# Patient Record
(demographics unavailable — no encounter records)

---

## 2024-10-16 NOTE — PHYSICAL EXAM
[General Appearance - Alert] : alert [General Appearance - In No Acute Distress] : in no acute distress [Sclera] : the sclera and conjunctiva were normal [Outer Ear] : the ears and nose were normal in appearance [Oropharynx] : the oropharynx was normal [Neck Appearance] : the appearance of the neck was normal [Neck Cervical Mass (___cm)] : no neck mass was observed [Jugular Venous Distention Increased] : there was no jugular-venous distention [Thyroid Diffuse Enlargement] : the thyroid was not enlarged [Thyroid Nodule] : there were no palpable thyroid nodules [Auscultation Breath Sounds / Voice Sounds] : lungs were clear to auscultation bilaterally [Heart Rate And Rhythm] : heart rate was normal and rhythm regular [Heart Sounds] : normal S1 and S2 [Heart Sounds Gallop] : no gallops [Murmurs] : no murmurs [Heart Sounds Pericardial Friction Rub] : no pericardial rub [Edema] : there was no peripheral edema [Bowel Sounds] : normal bowel sounds [Abdomen Soft] : soft [Abdomen Tenderness] : non-tender [Abdomen Mass (___ Cm)] : no abdominal mass palpated [Cervical Lymph Nodes Enlarged Posterior Bilaterally] : posterior cervical [Cervical Lymph Nodes Enlarged Anterior Bilaterally] : anterior cervical [Supraclavicular Lymph Nodes Enlarged Bilaterally] : supraclavicular [No Spinal Tenderness] : no spinal tenderness [Skin Color & Pigmentation] : normal skin color and pigmentation [Skin Turgor] : normal skin turgor [] : no rash [No Focal Deficits] : no focal deficits [Oriented To Time, Place, And Person] : oriented to person, place, and time [Impaired Insight] : insight and judgment were intact [Affect] : the affect was normal [FreeTextEntry1] : No synovitis;  (+)multiple non-tender B/L Heberden's nodes;  right hip w/ pain upon internal rotation, (+)tenderness over right trochanteric bursa;  normal ROM in rest of joints

## 2024-10-16 NOTE — ASSESSMENT
[FreeTextEntry1] : 68 year old female with:  - Reiterated importance of exercise  - Cont Tylenol prn (avoiding NSAID's as pt on Pllavix).  - warm compresses  - OTC topical analgesics - pt gets some relief from Voltaren gel. 2) Right knee pain: MRi revealed advanced OA + complex medial meniscal tear.  - Reiterated importance of knee exercises  - Analgesia as above 3) Osteoporosis: recent DEXA stable  - Cont alendronate 70mg weekly.  - Cont vit D. Pt was told to avoid calcium due to hx of kidney stones.  - Weight bearing exercise as tolerated.  - Will plan to repeat DEXA in 3/25. 4) Intermittent pain in hands: due to OA.  currently asymptomatic  - Hand exercise  - Analgesia as above.

## 2024-10-16 NOTE — HISTORY OF PRESENT ILLNESS
[Arthralgias] : arthralgias [FreeTextEntry1] : 10/16/2024:  Feeling "the same" since last visit.  Still w/ pain in her right hip and right knee, unchanged.  She saw orthopedics, who is planning right THR.  Also still w/ pain in her LUE - occurs primarily w/ activity.  No new complaints. 8/12/2024:  Pains in her right hip and right knee worse since last visit - both occur primarily w/ activity / no significant pain at rest.  She also now c/o pain in her proximal LUE.  No other new complaints.   6/19/2024:  Pt c/o progressively worsening severe pain in her right hips and right knee - over the past 2 weeks it has become so severe that she has difficulty walking.  The pain occurs primarily w/ any movement or when leaning on her hip/knee.  No pain at rest.  No joint swelling/erythema.  No pain in the rest of her joints. 2/14/2024:  Right hip pain had worsened over the past several weeks.  She saw orthopedics, who diagnosed her with trochanteric bursitis and performed a C-S injection, since which her pain has again improved.  LBP has resolved.  Right knee pain remains virtually resolved as well.  No other complaints. [Anorexia] : no anorexia [Weight Loss] : no weight loss [Malaise] : no malaise [Fever] : no fever [Chills] : no chills [Fatigue] : no fatigue [Malar Facial Rash] : no malar facial rash [Skin Lesions] : no lesions [Skin Nodules] : no skin nodules [Oral Ulcers] : no oral ulcers [Cough] : no cough [Dry Mouth] : no dry mouth [Dysphonia] : no dysphonia [Dysphagia] : no dysphagia [Shortness of Breath] : no shortness of breath [Chest Pain] : no chest pain [Joint Swelling] : no joint swelling [Joint Warmth] : no joint warmth [Joint Deformity] : no joint deformity [Decreased ROM] : no decreased range of motion [Morning Stiffness] : no morning stiffness [Falls] : no falls [Difficulty Standing] : no difficulty standing [Difficulty Walking] : no difficulty walking [Dyspnea] : no dyspnea [Myalgias] : no myalgias [Muscle Weakness] : no muscle weakness [Muscle Spasms] : no muscle spasms [Muscle Cramping] : no muscle cramping [Visual Changes] : no visual changes [Eye Pain] : no eye pain [Eye Redness] : no eye redness [Dry Eyes] : no dry eyes

## 2024-11-04 NOTE — PHYSICAL EXAM
[Well Developed] : well developed [Well Nourished] : well nourished [No Acute Distress] : no acute distress [Normal Conjunctiva] : normal conjunctiva [Normal Venous Pressure] : normal venous pressure [No Carotid Bruit] : no carotid bruit [Normal S1, S2] : normal S1, S2 [No Murmur] : no murmur [No Rub] : no rub [No Gallop] : no gallop [Clear Lung Fields] : clear lung fields [Good Air Entry] : good air entry [No Respiratory Distress] : no respiratory distress  [Soft] : abdomen soft [Non Tender] : non-tender [No Masses/organomegaly] : no masses/organomegaly [Normal Bowel Sounds] : normal bowel sounds [Normal Gait] : normal gait [No Edema] : no edema [No Cyanosis] : no cyanosis [No Clubbing] : no clubbing [No Varicosities] : no varicosities [No Rash] : no rash [No Skin Lesions] : no skin lesions [Moves all extremities] : moves all extremities [No Focal Deficits] : no focal deficits [Normal Speech] : normal speech [Alert and Oriented] : alert and oriented [Normal memory] : normal memory [de-identified] : [facility] site WNL

## 2024-11-04 NOTE — HISTORY OF PRESENT ILLNESS
[FreeTextEntry1] : 69F with HTN, HLD, CAD s/p PCI presents for preop evaluation  Scheduled for R THR on 11/18 with Dr Don Brian  CTA with significant CAD [facility] with 70% mRCA s/p NICOLE- recommended for 3 months of DAPT Feeling well overall, dyspnea from the past has improved  Diffuse muscle aches have dissipated (using statin x years)  Chronically elevated HR- sinus tach on ECG's  Nonsmoker, +secondhand smoke exposure. Mother had valve replacement around 50, F- MI in his 60s. Retired , currently watching her grandchildren  ECG: ST @ 111, PRWP  TTE 2024:  1. Left ventricular cavity is normal in size. Left ventricular wall thickness is normal. Left ventricular systolic function is normal with an ejection fraction of 63 % by Figueroa's method of disks. There are no regional wall motion abnormalities seen. 2. Normal left ventricular diastolic function, with normal filling pressure. 3. The left atrium is normal in size. 4. Fibrocalcific aortic valve sclerosis without stenosis. 5. There is mild calcification of the mitral valve annulus.  CTA 7/2024: . Probable moderate LAD and RCA stenosis. More severe stenosis is not excluded. [facility] 7/23/24:  70% mRCA s/p NICOLE ([facility])  Asa 81mg  Clopidogrel 75mg (d/c on 11/11/24)  Rosuvastatin 10mg  Enalapril 10mg HCTZ 25mg (cut to 12.5mg daily)  Add Toprol 25mg daily

## 2024-11-04 NOTE — DISCUSSION/SUMMARY
[EKG obtained to assist in diagnosis and management of assessed problem(s)] : EKG obtained to assist in diagnosis and management of assessed problem(s) [FreeTextEntry1] : CAD s/p PCI: 70% mRCA. No further symptoms  Recommendation for DAPT x 3 months, pending hip surgery in 2 weeks As she has now been on DAPT for 3+ months, OK to d/c plavix 7 days prior to surgery Continue aspirin 81mg  Continue statin  HTN: BP running lower today. HR chronically elevated. Add Toprol 25mg. Cut HCTZ to 12.5mg daily, continue enalapril   HLD: Lipids good on statin, low HDL. Continue meds.   Limited mobility due to hip pain. No evidence of ongoing ischemia, arrhythmia, valvular heart disease or decompensated heart failure.  This patient is optimized from a cardiac standpoint for this intermediate risk surgery.  No further cardiac testing is necessary prior to surgery.  RV 4M

## 2024-11-05 NOTE — HISTORY OF PRESENT ILLNESS
[Coronary Artery Disease] : coronary artery disease [Family Member] : family member with adverse anesthesia reaction/sudden death [Self] : previous adverse anesthesia reaction [(Patient denies any chest pain, claudication, dyspnea on exertion, orthopnea, palpitations or syncope)] : Patient denies any chest pain, claudication, dyspnea on exertion, orthopnea, palpitations or syncope [Unable to assess] : unable to assess [Anti-Platelet Agents: _____] : Anti-Platelet Agents: [unfilled] [Aortic Stenosis] : no aortic stenosis [Atrial Fibrillation] : no atrial fibrillation [Recent Myocardial Infarction] : no recent myocardial infarction [Implantable Device/Pacemaker] : no implantable device/pacemaker [Asthma] : no asthma [COPD] : no COPD [Smoker] : not a smoker [Chronic Anticoagulation] : no chronic anticoagulation [Chronic Kidney Disease] : no chronic kidney disease [Diabetes] : no diabetes [FreeTextEntry1] : DANIEL DELGADO [FreeTextEntry2] : 11/18/24 [FreeTextEntry3] : Dr Don Brian [FreeTextEntry4] : 69F w/ PMH of CAD s/p NICOLE, OA, osteoporosis, hip bursitis, HTN, HLD is coming in for MCA for R THR.  No acute complaints.  [FreeTextEntry5] : Never officially diagnosed w/ sleep apnea. Has had vomiting from anesthesia in the past. Mother would also have N/V from anesthesia in the past.

## 2024-11-05 NOTE — ASSESSMENT
[Patient Optimized for Surgery] : Patient optimized for surgery [No Further Testing Recommended] : no further testing recommended [As per surgery] : as per surgery [FreeTextEntry4] : 69F w/ PMH of CAD s/p NICOLE, OA, osteoporosis, hip bursitis, HTN, HLD is coming in for MCA for R THR.  #Pre-op exam -Reviewed CBC, CMP, EKG. CBC repeated, platelets acceptable. -Limited mobility due to hip pain  Patient is medically optimized for this intermediate risk surgery.

## 2024-11-05 NOTE — PHYSICAL EXAM
[Normal] : affect was normal and insight and judgment were intact [Normal Sclera/Conjunctiva] : normal sclera/conjunctiva [Normal Outer Ear/Nose] : the outer ears and nose were normal in appearance [No JVD] : no jugular venous distention [No Respiratory Distress] : no respiratory distress  [No Accessory Muscle Use] : no accessory muscle use [Clear to Auscultation] : lungs were clear to auscultation bilaterally [Normal Rate] : normal rate  [Regular Rhythm] : with a regular rhythm [No Edema] : there was no peripheral edema [Soft] : abdomen soft [Non Tender] : non-tender [Non-distended] : non-distended [de-identified] : Ambulates w/ walker

## 2024-11-05 NOTE — PHYSICAL EXAM
[Normal] : affect was normal and insight and judgment were intact [Normal Sclera/Conjunctiva] : normal sclera/conjunctiva [Normal Outer Ear/Nose] : the outer ears and nose were normal in appearance [No JVD] : no jugular venous distention [No Respiratory Distress] : no respiratory distress  [No Accessory Muscle Use] : no accessory muscle use [Clear to Auscultation] : lungs were clear to auscultation bilaterally [Normal Rate] : normal rate  [Regular Rhythm] : with a regular rhythm [No Edema] : there was no peripheral edema [Soft] : abdomen soft [Non Tender] : non-tender [Non-distended] : non-distended [de-identified] : Ambulates w/ walker

## 2024-11-07 NOTE — DATA REVIEWED
[FreeTextEntry1] : MRI R HIP [facility] IMPRESSION: Severe right hip joint space narrowing with full-thickness chondral loss with multifocal subchondral cysts within the articulating femoral head with marrow edema with undulated appearance of the articular contour of the femoral head, likely sequela of small subchondral fractures. Moderate hip effusion/synovial proliferation.

## 2024-11-07 NOTE — PHYSICAL EXAM
[Right] : right hip [] : no ecchymosis [de-identified] : KNEE PAIN WITH INTERNAL ROTATION OF HIP.  [TWNoteComboBox7] : flexion 100 degrees [de-identified] : external rotation 20 degrees [TWNoteComboBox6] : internal rotation 20 degrees

## 2024-11-07 NOTE — HISTORY OF PRESENT ILLNESS
[de-identified] : R HIP PAIN FOR 6 MONTHS.  NO INJURY OR HISTORY.  HAS BEEN TX ELSEWHERE WITH LATERAL INJECTIONS.  USING A ROLLATOR FOR STABILITY.  ALSO C/O R KNEE PAIN.  ON PLAVIX S/P CARDIAC STENT  TYLENOL ES DOES NOT HELP. HER DAUGHTER IS HERE.  11.7.24 PATIENT HERE FOR RIGHT HIP PAIN.

## 2024-11-07 NOTE — DISCUSSION/SUMMARY
[de-identified] : I, Loretta Torres, am scribing for Dr. Brian in his presence for the chief complaint, physical exam, studies, assessment, and/or plan.

## 2024-11-07 NOTE — ASSESSMENT
[FreeTextEntry1] : 69 year F WITH MODERATE RT HIP PAIN. PAIN IS IN THE GROIN AND DOES NOT RADIATE. PAIN WORSENS WITH WALKING PROLONGED DISTANCES. PAIN IS AFFECTING ADL AND FUNCTIONAL ACTIVITIES, PUTTING ON SHOES AND SOCKS. XRAYS REVIEWED WITH ADVANCED OA. HIP MRI REVIEWED WITH ADVANCED OA, AVN. TREATMENT OPTIONS REVIEWED. QUESTIONS ANSWERED. RT JOSUE AGAIN DISCUSSED AT LENGTH.   PMHx: CAD, CARDIAC STENT JULY 2024 - ON PLAVIX, HLD NO METAL ALLERGIES NO H/O DM NO H/O DVT/PE NO H/O MRSA/VRSA  RT JOSUE - SCHEDULED 11/18/24  The patient was advised of the diagnosis. The natural history of the pathology was explained in full to the patient in layman's terms. All questions were answered. The risks and benefits of surgical and non-surgical treatment alternatives were explained in full to the patient.   We discussed my findings and the natural history of their condition. We talked about the details of the proposed surgery and the recovery. We discussed the material risks, possible benefits and alternatives to surgery. The risks include but are not limited to infection, bleeding and possible need for blood transfusion, fracture, bowel blockage, bladder retention or infection, need for reoperation, stiffness and/or limited range of motion, possible damage to nerves and blood vessels, failure of fixation of components, risk of deep vein thromboses and pulmonary embolism, wound healing problems, dislocation, and possible leg length discrepancy. Although incredibly rare, we also discussed the risks of a cardiac event, stroke and even death during, or following, the surgery. We discussed the type of implants the patient will be receiving and the type of fixation that will be used, as well as whether a robot or computer navigation aide will be used. The patient understands they will need medical clearance and will attend a preoperative joint education class. We also discussed the type of anesthesia they will receive, and the risks associated with hospital or rehab length of stay, obesity, diabetes and smoking.   Patient Complains of pain in the affected joint with a level that often reaches greater than an 8/10. The pain has been progressively worsening throughout his/her treatment course. The pain has interfered with their ADLs and worsens with weight bearing. On exam they often have episodes of swelling/effusion with limited ROM. Pain worsens with ROM passive and active and I can palpate crepitus.   X- rays were reviewed with the patient and they show joint space narrowing, subchondral sclerosis, osteophyte formation, and subchondral cysts. After a period of more than 12 weeks physical therapy or exercise program done with me or another treating physician they have continued pain. The patient has failed a trial of NSAID medication or pain relievers if they were unable to tolerate NSAID medications as well as a series of injections, steroids, or hyaluronic acid. After a long discussion with the patient both the patient and I have decided we have exhausted all forms of less radical treatments, and they would like to proceed with Total Joint Replacement.   Patient will plan to schedule surgery. Patient requires rolling walker and 3-in-1 commode S/P surgery. Patient currently has limited mobility that significantly impairs their ability to participate in one or more mobility related activities of daily living in the home. The patient is able to safely use the walker, and the functional mobility deficit can be sufficiently resolved with the use of a walker. Patient will also be confined to one level of the home environment and there is no toilet on that level. Requires 3-in-1 commode for bedside use as patient cannot access bathroom safely in their home in timely manner. Will order rolling walker and 3-in-1 commode.

## 2024-12-12 NOTE — HISTORY OF PRESENT ILLNESS
[2] : 2 [de-identified] : R HIP PAIN FOR 6 MONTHS.  NO INJURY OR HISTORY.  HAS BEEN TX ELSEWHERE WITH LATERAL INJECTIONS.  USING A ROLLATOR FOR STABILITY.  ALSO C/O R KNEE PAIN.  ON PLAVIX S/P CARDIAC STENT  TYLENOL ES DOES NOT HELP. HER DAUGHTER IS HERE.  11.7.24 PATIENT HERE FOR RIGHT HIP PAIN.  12.12.24 PATIENT HERE FOR RIGHT HIP PAIN. DOS: 11.18.24

## 2024-12-12 NOTE — PHYSICAL EXAM
[Right] : right hip [] : no sign of infection [FreeTextEntry9] : WAS NOT ASSESSED.  [de-identified] : WAS NOT ASSESSED.

## 2024-12-12 NOTE — ASSESSMENT
[FreeTextEntry1] : S/P RT JOSUE 11/18/24: DOING WELL. NO F/C/S. XRAYS REVIEWED WITH COMPONENTS WELL FIXED. NO SIGNS OF INFECTION. WE DISCUSSED THE IMPORTANCE OF ELEVATION TO REDUCE SWELLING. PROPER ELEVATION TECHNIQUES DISCUSSED. ABX AND PRECAUTIONS REVIEWED. PT RX. QUESTIONS ANSWERED.

## 2024-12-23 NOTE — HISTORY OF PRESENT ILLNESS
[FreeTextEntry1] : 69F w/ PMH of CAD s/p NICOLE, OA, osteoporosis, hip bursitis, HTN, HLD is coming in for follow up of chronic conditions. [de-identified] : 69F w/ PMH of CAD s/p NICOLE, OA, osteoporosis, hip bursitis, HTN, HLD is coming in for follow up of chronic conditions.  Had R hip replacement approx 1 month ago, doing very well. Patient's pain and mobility have improved greatly.  Took alendronate today, but not other meds.

## 2024-12-23 NOTE — ASSESSMENT
[FreeTextEntry1] : 69F w/ PMH of CAD s/p NICOLE, OA, osteoporosis, hip bursitis, HTN, HLD is coming in for follow up of chronic conditions.  #HTN -BP today acceptable, has not taken her BP meds today as of yet -C/w current HTN med regimen  -CMP today  #CAD -C/w ASA, off plavix now as she completed 3 months as per [facility]   #GERD -Well controlled on PPI, reduce to pantoprazole 20mg qd and assess response   #HLD -Check Lipid profile today   #Hx of anemia -Check CBC  #Osteoporosis -C/w Alendronate, recommended she restart her Vitamin D supplements   RTC in 3 months

## 2024-12-23 NOTE — PHYSICAL EXAM
[No Respiratory Distress] : no respiratory distress  [No Accessory Muscle Use] : no accessory muscle use [Clear to Auscultation] : lungs were clear to auscultation bilaterally [Regular Rhythm] : with a regular rhythm [Normal] : affect was normal and insight and judgment were intact [de-identified] : rate tachycardic  [de-identified] : R Hip incision from surgery c/d/i, no purulence or erythema

## 2025-02-04 NOTE — HEALTH RISK ASSESSMENT
[Patient reported mammogram was normal] : Patient reported mammogram was normal [Patient reported colonoscopy was normal] : Patient reported colonoscopy was normal [Yes] : Yes [Monthly or less (1 pt)] : Monthly or less (1 point) [1 or 2 (0 pts)] : 1 or 2 (0 points) [Never (0 pts)] : Never (0 points) [No] : In the past 12 months have you used drugs other than those required for medical reasons? No [Never] : Never [Audit-CScore] : 1 [MammogramDate] : 05/24 [BoneDensityDate] : 03/23 [BoneDensityComments] : Osteoporosis  [ColonoscopyComments] : Repeat in 10 years recommended

## 2025-02-04 NOTE — HISTORY OF PRESENT ILLNESS
[FreeTextEntry1] : 69F w/ PMH of CAD s/p NICOLE, OA, osteoporosis, hip bursitis, HTN, HLD is coming in for AWV.  [de-identified] : 69F w/ PMH of CAD s/p NICOLE, OA, osteoporosis, hip bursitis, HTN, HLD is coming in for AWV.   Follows w/ Neurologist Dr. Dr. Esquivel for left parietal calvarial lesion, currently stable   Grandson at home, just got flu. Patient feels fine currently.   Some sciatica of L leg. Going to PT after hip replacement

## 2025-02-04 NOTE — PHYSICAL EXAM
[Normal Sclera/Conjunctiva] : normal sclera/conjunctiva [PERRL] : pupils equal round and reactive to light [EOMI] : extraocular movements intact [No Lymphadenopathy] : no lymphadenopathy [Supple] : supple [Thyroid Normal, No Nodules] : the thyroid was normal and there were no nodules present [No Respiratory Distress] : no respiratory distress  [No Accessory Muscle Use] : no accessory muscle use [Clear to Auscultation] : lungs were clear to auscultation bilaterally [Normal Rate] : normal rate  [Regular Rhythm] : with a regular rhythm [Soft] : abdomen soft [Non Tender] : non-tender [Non-distended] : non-distended [Normal Supraclavicular Nodes] : no supraclavicular lymphadenopathy [Normal Anterior Cervical Nodes] : no anterior cervical lymphadenopathy [No Focal Deficits] : no focal deficits [Normal] : affect was normal and insight and judgment were intact [de-identified] : trace edema b/l

## 2025-02-25 NOTE — HISTORY OF PRESENT ILLNESS
[FreeTextEntry1] : 69F here for evaluation for if she needs a repeat colonoscopy.   Last colonoscopy 3/2021 - diverticulosis of L colon, otherwise normal, no follow up interval indicated on patient's report.  EGD 3/2021 - gastritis, biopsied, otherwise normal, normal esophagus - biopsied. No path available.   If gets constipated can have some blood on toilet paper in bowl. Occasional. If she is regular and not pushing, there is no issue. No abdominal pain. No FHx CRC  hgb 9, was previously normal. Had hip replacement surgery 11/18/24.   PMHx CAD s/p NICOLE 7/2024, HTN, HL PSHx total hip replacement R hip 11/18/24 All NKDA Meds enalapril, ASA 81mg, clopidogrel. alendronate, HCTZ, metoprolol, pantoprazole 20mg, rosuvastatin, cyclobenzaprine, iron BID, D3 SHx nonsmoker, occ ETOH, retired FHx neg for GI d/o, GI cancer

## 2025-02-25 NOTE — ASSESSMENT
[FreeTextEntry1] : 1. CRC screening - up to date. last exam 2021, no lesions, photos look like good prep, can repeat in 10 years, 2031.  2. Anemia - suspect related to blood loss from hip surgery given time course. Agree with PO Fe.  Follow up PRN

## 2025-02-28 NOTE — IMAGING
[Right] : right hip with pelvis [All Views] : anteroposterior, lateral [Components well fixed, in good position] : Components well fixed, in good position [Disc space narrowing] : Disc space narrowing

## 2025-02-28 NOTE — PHYSICAL EXAM
[Right] : right hip [Flexion] : flexion [Extension] : extension [] : no sign of infection [FreeTextEntry9] : WAS NOT ASSESSED.  [de-identified] : WAS NOT ASSESSED.

## 2025-02-28 NOTE — HISTORY OF PRESENT ILLNESS
[2] : 2 [de-identified] : R HIP PAIN FOR 6 MONTHS.  NO INJURY OR HISTORY.  HAS BEEN TX ELSEWHERE WITH LATERAL INJECTIONS.  USING A ROLLATOR FOR STABILITY.  ALSO C/O R KNEE PAIN.  ON PLAVIX S/P CARDIAC STENT  TYLENOL ES DOES NOT HELP. HER DAUGHTER IS HERE.  11.7.24 PATIENT HERE FOR RIGHT HIP PAIN.  12.12.24 PATIENT HERE FOR RIGHT HIP PAIN. DOS: 11.18.24  2.27.25 PATIENT HERE FOR RIGHT HIP PAIN. DOS: 11.18.24. SINCE LAST VISIT PATIENT STATES LEFT HIP STARTED TO HURT. PAIN STARTED AFTER LIFTING A 2 YO CHILD. PAIN RADAITES FROM LOW BACK DOWN INTO THIGH. NO GROIN PAIN. PARESTEHSIAS AT TIMES. LEG FEELS WEAK. NO DROP FOOT.  RIGHT HIP DOING GREAT IN PT

## 2025-02-28 NOTE — HISTORY OF PRESENT ILLNESS
[2] : 2 [de-identified] : R HIP PAIN FOR 6 MONTHS.  NO INJURY OR HISTORY.  HAS BEEN TX ELSEWHERE WITH LATERAL INJECTIONS.  USING A ROLLATOR FOR STABILITY.  ALSO C/O R KNEE PAIN.  ON PLAVIX S/P CARDIAC STENT  TYLENOL ES DOES NOT HELP. HER DAUGHTER IS HERE.  11.7.24 PATIENT HERE FOR RIGHT HIP PAIN.  12.12.24 PATIENT HERE FOR RIGHT HIP PAIN. DOS: 11.18.24  2.27.25 PATIENT HERE FOR RIGHT HIP PAIN. DOS: 11.18.24. SINCE LAST VISIT PATIENT STATES LEFT HIP STARTED TO HURT. PAIN STARTED AFTER LIFTING A 2 YO CHILD. PAIN RADAITES FROM LOW BACK DOWN INTO THIGH. NO GROIN PAIN. PARESTEHSIAS AT TIMES. LEG FEELS WEAK. NO DROP FOOT.  RIGHT HIP DOING GREAT IN PT

## 2025-02-28 NOTE — PHYSICAL EXAM
[Right] : right hip [Flexion] : flexion [Extension] : extension [] : no sign of infection [FreeTextEntry9] : WAS NOT ASSESSED.  [de-identified] : WAS NOT ASSESSED.

## 2025-02-28 NOTE — ASSESSMENT
[FreeTextEntry1] : S/P RT JOSUE 11/18/24: DOING WELL. NO F/C/S. XRAYS REVIEWED WITH COMPONENTS WELL FIXED. NO SIGNS OF INFECTION. WE DISCUSSED THE IMPORTANCE OF ELEVATION TO REDUCE SWELLING. PROPER ELEVATION TECHNIQUES DISCUSSED. ABX AND PRECAUTIONS REVIEWED. PT RX. QUESTIONS ANSWERED.   PAIN TO LEFT FROM BACK. XRAYS WITH DEG CHANGES AND SPONDY WILL GET MRI TO R/O COMPRESSION FX.  MDP GIVEN, MOBIC GIVEN FOR AFTER MDP WILL CALL FOR RESULTS. HOLD PT UNTIL MRI LUMBAR   JOSUE IS DOING GREAT. WILL FU IN NOV CONT PT HIP

## 2025-03-20 NOTE — HISTORY OF PRESENT ILLNESS
[FreeTextEntry1] : 69F with HTN, HLD, CAD s/p PCI presents for preop evaluation  Tolerated R THR 11/2024 Plavix d/c'ed after 3 months  Some ongoing back problems, going to PT Some dyspnea with climbing stairs  No CP Some lightheadedness when turning head Toprol started at last visit for chronically elevated HR, HR improved  Anemic since surgery, now on iron supplements   Diffuse muscle aches have dissipated (using statin x years)   Nonsmoker, +secondhand smoke exposure. Mother had valve replacement around 50, F- MI in his 60s. Retired , currently watching her grandchildren  ECG: ST @ 87, PRWP  TTE 2024:  1. Left ventricular cavity is normal in size. Left ventricular wall thickness is normal. Left ventricular systolic function is normal with an ejection fraction of 63 % by Figueroa's method of disks. There are no regional wall motion abnormalities seen. 2. Normal left ventricular diastolic function, with normal filling pressure. 3. The left atrium is normal in size. 4. Fibrocalcific aortic valve sclerosis without stenosis. 5. There is mild calcification of the mitral valve annulus.  CTA 7/2024: . Probable moderate LAD and RCA stenosis. More severe stenosis is not excluded. C 7/23/24:  70% mRCA s/p NICOLE ([facility])  Asa 81mg  Rosuvastatin 10mg  Enalapril 10mg HCTZ 12.5mg daily  Toprol 25mg daily

## 2025-03-20 NOTE — DISCUSSION/SUMMARY
[FreeTextEntry1] : CAD s/p PCI: 70% mRCA. No further symptoms. S/p 3 months of DAPT   Continue aspirin 81mg monotherapy Continue statin- lipids great  HTN: BP decent. HR chronically elevated but improving on Toprol  Continue Toprol 25mg Continue HCTZ, Enalapril, normal CMP   HLD: Lipids good on statin, low HDL. Continue meds.   RV 6M

## 2025-04-09 NOTE — ASSESSMENT
[FreeTextEntry1] : 68 year old female with: 1)  Right hip pain:  due to OA.  Now resolved s/p recent THR  - Reiterated importance of exercise  - Cont Tylenol prn (avoiding NSAID's as pt on Plavix).  - warm compresses  - OTC topical analgesics - pt gets some relief from Voltaren gel. 2) Right knee pain: MRI revealed advanced OA + complex medial meniscal tear.  Currently improved.  - Reiterated importance of knee exercises  - Analgesia as above 3) Osteoporosis: recent DEXA slightly worse.    - D/C alendronate, will plan to start Prolia.  - Cont vit D.  Pt was told to avoid calcium due to hx of kidney stones.  - Weight bearing exercise as tolerated.  - Check labs. 4) Intermittent pain in hands: due to OA.  currently asymptomatic  - Hand exercise  - Analgesia as above. 5)  Low back pain:  prior MRI revealed OA + spinal stenosis   - Refer to PT / reiterated importance of back exercises   - Analgesia prn as above.

## 2025-04-09 NOTE — HISTORY OF PRESENT ILLNESS
[Arthralgias] : arthralgias [FreeTextEntry1] : 4/9/2025:  Pt underwent right THR in 11/2024 - since then her right hip pain has virtually resolved.  She now c/o severe low back pain, worst w/ standing/walking.  Improves w/ leaning forward. 10/16/2024:  Feeling "the same" since last visit.  Still w/ pain in her right hip and right knee, unchanged.  She saw orthopedics, who is planning right THR.  Also still w/ pain in her LUE - occurs primarily w/ activity.  No new complaints. 8/12/2024:  Pains in her right hip and right knee worse since last visit - both occur primarily w/ activity / no significant pain at rest.  She also now c/o pain in her proximal LUE.  No other new complaints.   6/19/2024:  Pt c/o progressively worsening severe pain in her right hips and right knee - over the past 2 weeks it has become so severe that she has difficulty walking.  The pain occurs primarily w/ any movement or when leaning on her hip/knee.  No pain at rest.  No joint swelling/erythema.  No pain in the rest of her joints. 2/14/2024:  Right hip pain had worsened over the past several weeks.  She saw orthopedics, who diagnosed her with trochanteric bursitis and performed a C-S injection, since which her pain has again improved.  LBP has resolved.  Right knee pain remains virtually resolved as well.  No other complaints. [Anorexia] : no anorexia [Weight Loss] : no weight loss [Malaise] : no malaise [Fever] : no fever [Chills] : no chills [Fatigue] : no fatigue [Malar Facial Rash] : no malar facial rash [Skin Lesions] : no lesions [Skin Nodules] : no skin nodules [Oral Ulcers] : no oral ulcers [Cough] : no cough [Dry Mouth] : no dry mouth [Dysphonia] : no dysphonia [Dysphagia] : no dysphagia [Shortness of Breath] : no shortness of breath [Chest Pain] : no chest pain [Joint Swelling] : no joint swelling [Joint Warmth] : no joint warmth [Joint Deformity] : no joint deformity [Decreased ROM] : no decreased range of motion [Morning Stiffness] : no morning stiffness [Falls] : no falls [Difficulty Standing] : no difficulty standing [Difficulty Walking] : no difficulty walking [Dyspnea] : no dyspnea [Myalgias] : no myalgias [Muscle Weakness] : no muscle weakness [Muscle Spasms] : no muscle spasms [Muscle Cramping] : no muscle cramping [Visual Changes] : no visual changes [Eye Pain] : no eye pain [Eye Redness] : no eye redness [Dry Eyes] : no dry eyes

## 2025-04-22 NOTE — IMAGING
[de-identified] : L Spine Inspection: No defects or deformities Palpation: No tenderness or spasms in b/l lumbar paraspinal musculature ROM: diminished in all planes Strength: 5/5 b/l hip flexors, knee extensors, ankle dorsiflexors, EHL, ankle plantarflexors Neuro: Sensation LT I Negative b/l SLR Toe and heal walk intact Gait non antalgic. Ambulating with a cane.

## 2025-04-22 NOTE — HISTORY OF PRESENT ILLNESS
[Lower back] : lower back [de-identified] : 04/22/2025: 69 year F presenting for evaluation of lower back pain that started after carrying toddler 3 months ago. Pain across lower back intermittently radiates into LLE. Saw Dr. Brian on 2/25, had xr's and L Spine completed. Was informed no significant left hip OA that would require sx. Was given rx for pain meds, taking them sparingly with good benefit. Pain has been progressively improving since.   pmhx: R JOSUE on 11/2024

## 2025-04-22 NOTE — ASSESSMENT
[FreeTextEntry1] : 70 y/o F with improving lower back pain and intermittent LLE radiculopathy x 2 months. MRI: listhesis at L3-4 with moderate spinal and foraminal stenosis; mild-moderate at L4-5. Exam w/o red flags or weakness. Sxs on an improving trend with PT and meds (MDP)  - C/w PT and focus on strengthening core.  - Discussed if radicular pain worsens, will consider pain mgmt. - F/up in 1 month.

## 2025-04-22 NOTE — DATA REVIEWED
[MRI] : MRI [Lumbar Spine] : lumbar spine [Report was reviewed and noted in the chart] : The report was reviewed and noted in the chart [I independently reviewed and interpreted images and report] : I independently reviewed and interpreted images and report [FreeTextEntry1] : @[facility] MRI 2/27/25 1. L3-4 anterolisthesis with moderate b/l facet OA & spinal/foraminal stenosis.  2. L4-5 mild-moderate central stenosis

## 2025-04-22 NOTE — REASON FOR VISIT
[FreeTextEntry2] : New patient lower back pain. MRI and CT scan done at [facility]

## 2025-05-20 NOTE — HISTORY OF PRESENT ILLNESS
[Lower back] : lower back [de-identified] : 5/20/25: Follow up L Spine. Started PT, however, pain into LLE has worsened in the past month. Taking pain meds with some relief.    04/22/2025: 69 year F presenting for evaluation of lower back pain that started after carrying toddler 3 months ago. Pain across lower back intermittently radiates into LLE. Saw Dr. Brian on 2/25, had xr's and L Spine completed. Was informed no significant left hip OA that would require sx. Was given rx for pain meds, taking them sparingly with good benefit. Pain has been progressively improving since.   pmhx: R JOSUE on 11/2024

## 2025-05-20 NOTE — IMAGING
[de-identified] : L Spine Inspection: No defects or deformities Palpation: No tenderness or spasms in b/l lumbar paraspinal musculature ROM: diminished in all planes Strength: 5/5 b/l hip flexors, knee extensors, ankle dorsiflexors, EHL, ankle plantarflexors Neuro: Sensation LT I Negative b/l SLR Toe and heal walk intact Gait non antalgic. Ambulating with a cane.

## 2025-05-20 NOTE — ASSESSMENT
[FreeTextEntry1] : 70 y/o F with improving lower back pain and worsening LLE radiculopathy x 2 months. MRI: listhesis at L3-4 with moderate spinal and foraminal stenosis; mild-moderate at L4-5. Attending PT w/o much benefit.    - Referred to pain mgmt, recommend a L3-4 LESI.  - Renewed pain meds (Oxy/Acetaminophen).  - F/up 2 weeks s/p injection.

## 2025-06-11 NOTE — PROCEDURE
[FreeTextEntry3] : Date of Service: 06/11/2025   Account: [id number]   Patient: NORM DUENAS   YOB: 1955   Age: 69 year     Surgeon: Gustavo Coburn M.D.   Assistant: None.   Pre-Operative Diagnosis: Lumbosacral radiculitis   Post Operative Diagnosis: Lumbosacral radiculitis   Procedure: Left L5-S1 transforaminal epidural steroid injection under fluoroscopic guidance.   Anesthesia:  MAC     This procedure was carried out using fluoroscopic guidance.  The risks and benefits of the procedure were discussed extensively with the patient.  The consent of the patient was obtained and the following procedure was performed. The patient was placed in the prone position on the fluoroscopic table and the lumbar area was prepped and draped in a sterile fashion.   The left L5-S1 neural foramen was then identified on left oblique "april dog" anatomical view at the 6 o' clock position using fluoroscopic guidance, and the area was marked. The overlying skin and subcutaneous structures were anesthetized using sterile technique with 1% Lidocaine.  A 22 gauge spinal needle was directed toward the inferior (6 o'clock) position of the pedicle, which formed the roof of the identified foramen.  Once in the epidural space, after negative aspiration for heme and CSF, 1cc of Omnipaque contrast was injected to confirm epidural location and assess filling defects and rule out intravascular needle placement.   The following contrast flow and epidurogram was observed: no intravascular or intrathecal flow pattern was noted.  No blood or CSF was aspirated. Omnipaque spread appeared to outline the left L5 nerve root and spread medially into the epidural space.   After this, an injectate of 3 cc preservative free normal saline plus 80 mg of kenalog was injected in the epidural space.   The needle was subsequently removed.  Vital signs remained normal.  Pulse oximeter was used throughout the procedure and the patient's pulse and oxygen saturation remained within normal limits.  The patient tolerated the procedure well.  There were no complications.  The patient was instructed to apply ice over the injection sites for twenty minutes every two hours for the next 24 to 48 hours.  The patient was also instructed to contact me immediately if there were any problems.     Gustavo Coburn M.D.

## 2025-06-26 NOTE — PHYSICAL EXAM
[de-identified] : PHYSICAL EXAM  Constitutional:  Appears well, no apparent distress Ability to communicate: Normal Respiratory: non-labored breathing Skin: no rash noted Head: normocephalic, atraumatic Neck: no visible thyroid enlargement Eyes: extraocular movements intact Neurologic: alert and oriented x3 Psychiatric: normal mood, affect, and behavior   Back, including spine: Range of motion of the thoracic and lumbar spine is as follows: Diminished range of motion in all planes.  MMT 5/5 BL LE.  Sensation is intact to light touch and pin prick BL LE.  Negative Straight leg raise testing bilaterally.  Negatvie Kemps maneuver bilaterally.  Normal Gait.   Assessment: Lumbago  Plan: After discussing various treatment options with the patient including but not limited to oral medications, physical therapy, exercise modalities as well as interventional spinal injections, we have decided with the following plan:   Continue home exercises, stretching, activity modification, physical therapy, and conservative care.

## 2025-06-26 NOTE — HISTORY OF PRESENT ILLNESS
[Lower back] : lower back [Left Leg] : left leg [Burning] : burning [Dull/Aching] : dull/aching [Shooting] : shooting [Throbbing] : throbbing [] : yes [Constant] : constant [Household chores] : household chores [Social interactions] : social interactions [Rest] : rest [Meds] : meds [Standing] : standing [Walking] : walking [Bending forward] : bending forward [Stairs] : stairs [3] : 3 [2] : 2 [Intermittent] : intermittent [Injection therapy] : injection therapy [FreeTextEntry1] : LT L5-S1 TFESI-06/11/2025 [FreeTextEntry7] : lt leg  [FreeTextEntry9] : cane

## 2025-06-26 NOTE — ASSESSMENT
[FreeTextEntry1] : 90% relief pp improved rom and adls Pt. presents with 90% Relief of pain and improvement in ROM and ADLS post injection.  Able to sit, stand, walk, sleep for longer periods of time.

## 2025-06-26 NOTE — PHYSICAL EXAM
[de-identified] : PHYSICAL EXAM  Constitutional:  Appears well, no apparent distress Ability to communicate: Normal Respiratory: non-labored breathing Skin: no rash noted Head: normocephalic, atraumatic Neck: no visible thyroid enlargement Eyes: extraocular movements intact Neurologic: alert and oriented x3 Psychiatric: normal mood, affect, and behavior   Back, including spine: Range of motion of the thoracic and lumbar spine is as follows: Diminished range of motion in all planes.  MMT 5/5 BL LE.  Sensation is intact to light touch and pin prick BL LE.  Negative Straight leg raise testing bilaterally.  Negatvie Kemps maneuver bilaterally.  Normal Gait.   Assessment: Lumbago  Plan: After discussing various treatment options with the patient including but not limited to oral medications, physical therapy, exercise modalities as well as interventional spinal injections, we have decided with the following plan:   Continue home exercises, stretching, activity modification, physical therapy, and conservative care.

## 2025-06-26 NOTE — PHYSICAL EXAM
[de-identified] : PHYSICAL EXAM  Constitutional:  Appears well, no apparent distress Ability to communicate: Normal Respiratory: non-labored breathing Skin: no rash noted Head: normocephalic, atraumatic Neck: no visible thyroid enlargement Eyes: extraocular movements intact Neurologic: alert and oriented x3 Psychiatric: normal mood, affect, and behavior   Back, including spine: Range of motion of the thoracic and lumbar spine is as follows: Diminished range of motion in all planes.  MMT 5/5 BL LE.  Sensation is intact to light touch and pin prick BL LE.  Negative Straight leg raise testing bilaterally.  Negatvie Kemps maneuver bilaterally.  Normal Gait.   Assessment: Lumbago  Plan: After discussing various treatment options with the patient including but not limited to oral medications, physical therapy, exercise modalities as well as interventional spinal injections, we have decided with the following plan:   Continue home exercises, stretching, activity modification, physical therapy, and conservative care.